# Patient Record
Sex: MALE | Race: WHITE | NOT HISPANIC OR LATINO | URBAN - METROPOLITAN AREA
[De-identification: names, ages, dates, MRNs, and addresses within clinical notes are randomized per-mention and may not be internally consistent; named-entity substitution may affect disease eponyms.]

---

## 2019-03-03 ENCOUNTER — EMERGENCY (EMERGENCY)
Facility: HOSPITAL | Age: 59
LOS: 1 days | End: 2019-03-03
Attending: EMERGENCY MEDICINE
Payer: COMMERCIAL

## 2019-03-03 VITALS
TEMPERATURE: 98 F | HEIGHT: 73 IN | RESPIRATION RATE: 18 BRPM | OXYGEN SATURATION: 96 % | DIASTOLIC BLOOD PRESSURE: 104 MMHG | SYSTOLIC BLOOD PRESSURE: 148 MMHG | HEART RATE: 68 BPM | WEIGHT: 250 LBS

## 2019-03-03 VITALS
HEART RATE: 70 BPM | OXYGEN SATURATION: 99 % | DIASTOLIC BLOOD PRESSURE: 90 MMHG | RESPIRATION RATE: 16 BRPM | SYSTOLIC BLOOD PRESSURE: 131 MMHG

## 2019-03-03 PROCEDURE — 99284 EMERGENCY DEPT VISIT MOD MDM: CPT | Mod: 25

## 2019-03-03 PROCEDURE — 73030 X-RAY EXAM OF SHOULDER: CPT

## 2019-03-03 PROCEDURE — 23500 CLTX CLAVICULAR FX W/O MNPJ: CPT | Mod: LT,54

## 2019-03-03 PROCEDURE — 23500 CLTX CLAVICULAR FX W/O MNPJ: CPT | Mod: LT

## 2019-03-03 PROCEDURE — 71046 X-RAY EXAM CHEST 2 VIEWS: CPT

## 2019-03-03 PROCEDURE — 71046 X-RAY EXAM CHEST 2 VIEWS: CPT | Mod: 26

## 2019-03-03 PROCEDURE — 73030 X-RAY EXAM OF SHOULDER: CPT | Mod: 26,LT

## 2019-03-03 RX ORDER — ACETAMINOPHEN 500 MG
975 TABLET ORAL ONCE
Qty: 0 | Refills: 0 | Status: COMPLETED | OUTPATIENT
Start: 2019-03-03 | End: 2019-03-03

## 2019-03-03 RX ORDER — LIDOCAINE 4 G/100G
1 CREAM TOPICAL ONCE
Qty: 0 | Refills: 0 | Status: COMPLETED | OUTPATIENT
Start: 2019-03-03 | End: 2019-03-03

## 2019-03-03 RX ORDER — IBUPROFEN 200 MG
600 TABLET ORAL ONCE
Qty: 0 | Refills: 0 | Status: COMPLETED | OUTPATIENT
Start: 2019-03-03 | End: 2019-03-03

## 2019-03-03 RX ADMIN — Medication 975 MILLIGRAM(S): at 22:57

## 2019-03-03 RX ADMIN — Medication 600 MILLIGRAM(S): at 22:57

## 2019-03-03 RX ADMIN — LIDOCAINE 1 PATCH: 4 CREAM TOPICAL at 22:57

## 2019-03-03 NOTE — ED PROVIDER NOTE - PRINCIPAL DIAGNOSIS
Closed nondisplaced fracture of left clavicle, unspecified part of clavicle, initial encounter Closed displaced fracture of left clavicle, unspecified part of clavicle, initial encounter

## 2019-03-03 NOTE — ED PROVIDER NOTE - PHYSICAL EXAMINATION
Gen: NAD, non-toxic, conversational  Eyes: PERRLA, EOMI   HENT: Normocephalic, atraumatic. External ears normal, no rhinorrhea, moist mucous membranes.   CV: RRR, no M/R/G  Resp: CTAB, non-labored, speaking without difficulty on room air  Abd: soft, non tender, non rigid, no guarding or rebound tenderness  Back: No CVAT bilaterally, no midline ttp  Ext: LUE with ttp over the AC, swelling, abrasion of skin, good ROM at the shoulder, 4/5 str on abduction on left, 5/5 on right, 5/5 adduction bilateral, 5/5 flex/ext bilateral  Skin: dry, wwp   Neuro: AOx3, speech is fluent and appropriate, CN 2-12 intact, sensation grossly intact, gait at baseline per patient (steppage gait)   Psych: Mood concerned, affect euthymic

## 2019-03-03 NOTE — ED PROVIDER NOTE - NSFOLLOWUPCLINICS_GEN_ALL_ED_FT
[facility]  Orthopedic Surgery  [address]  [place] [address]  Phone: [phone]  Fax:   Follow Up Time: 1-3 Days    [facility]  Orthopedic Surgery  [address], 3rd & 4th [place] [address]  Phone: [phone]  Fax:   Follow Up Time: 1-3 Days    [facility]  Orthopedic Surgery  [address]  [place] [address]  Phone: [phone]  Fax:   Follow Up Time: 1-3 Days    [facility]  Orthopedic Surgery  [address] [place] [address]  Phone: [phone]  Fax:   Follow Up Time: 1-3 Days
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
## 2019-03-03 NOTE — ED PROVIDER NOTE - NSFOLLOWUPINSTRUCTIONS_ED_ALL_ED_FT
Take acetaminophen (Tylenol) 975mg (3 regular strength tablets or 2 extra strength tablets) as often as every 6 hours as needed for pain. Never take more than 4000mg of acetaminophen/Tylenol in any 24 hour period. Be cautious of over the counter medications as many formularies contain acetaminophen in them. Take ibuprofen (or Motrin) 600mg (3 tablets) up to 4 times per day as needed for pain with food or milk. Follow up with the orthopedics referral of your choice, call in the morning to schedule an appointment to discuss your symptoms. Return here to the emergency department for new / worsening symptoms of concern such as severe intractable pain, numbness/weakness of your other extremities, incontinence of bowel or bladder, or other new symptoms of acute worry. Take acetaminophen (Tylenol) 975mg (3 regular strength tablets or 2 extra strength tablets) as often as every 6 hours as needed for pain. Never take more than 4000mg of acetaminophen/Tylenol in any 24 hour period. Be cautious of over the counter medications as many formularies contain acetaminophen in them. Take ibuprofen (or Motrin) 600mg (3 tablets) up to 4 times per day as needed for pain with food or milk. Follow up with the orthopedics referral of your choice, call in the morning to schedule an appointment to discuss your symptoms. Return here to the emergency department for new / worsening symptoms of concern such as severe intractable pain, numbness/weakness of your other extremities, incontinence of bowel or bladder, or other new symptoms of acute worry.    Keep sling on at all times until you follow up with orthopedist.

## 2019-03-03 NOTE — ED ADULT NURSE NOTE - CHPI ED NUR SYMPTOMS NEG
no headache/no loss of consciousness/no neck tenderness/no sleeping issues/no laceration/no acting out behaviors/no disorientation/no dizziness/no bruising/no fussiness/no crying/no decreased eating/drinking/no difficulty bearing weight/no back pain
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
## 2019-03-03 NOTE — ED PROVIDER NOTE - CARE PLAN
Principal Discharge DX:	Closed nondisplaced fracture of left clavicle, unspecified part of clavicle, initial encounter Principal Discharge DX:	Closed displaced fracture of left clavicle, unspecified part of clavicle, initial encounter
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
## 2019-03-03 NOTE — ED ADULT NURSE NOTE - NSIMPLEMENTINTERV_GEN_ALL_ED
Implemented All Universal Safety Interventions:  [place] to call system. Call bell, personal items and telephone within reach. Instruct patient to call for assistance. Room bathroom lighting operational. Non-slip footwear when patient is off stretcher. Physically safe environment: no spills, clutter or unnecessary equipment. Stretcher in lowest position, wheels locked, appropriate side rails in place.

## 2019-03-06 ENCOUNTER — APPOINTMENT (OUTPATIENT)
Dept: ORTHOPEDIC SURGERY | Facility: CLINIC | Age: 59
End: 2019-03-06
Payer: COMMERCIAL

## 2019-03-06 VITALS
DIASTOLIC BLOOD PRESSURE: 84 MMHG | SYSTOLIC BLOOD PRESSURE: 130 MMHG | BODY MASS INDEX: 33.13 KG/M2 | HEIGHT: 73 IN | HEART RATE: 76 BPM | WEIGHT: 250 LBS

## 2019-03-06 DIAGNOSIS — Z78.9 OTHER SPECIFIED HEALTH STATUS: ICD-10-CM

## 2019-03-06 DIAGNOSIS — Z60.2 PROBLEMS RELATED TO LIVING ALONE: ICD-10-CM

## 2019-03-06 DIAGNOSIS — M16.10 UNILATERAL PRIMARY OSTEOARTHRITIS, UNSPECIFIED HIP: ICD-10-CM

## 2019-03-06 PROCEDURE — 99204 OFFICE O/P NEW MOD 45 MIN: CPT

## 2019-03-06 RX ORDER — IBUPROFEN 800 MG
TABLET ORAL
Refills: 0 | Status: ACTIVE | COMMUNITY

## 2019-03-06 SDOH — SOCIAL STABILITY - SOCIAL INSECURITY: PROBLEMS RELATED TO LIVING ALONE: Z60.2

## 2019-03-06 NOTE — DISCUSSION/SUMMARY
[de-identified] : Discussed findings of today's exam and possible causes of patient's pain.  Educated patient on their diagnosis of displaced left clavicle fracture.  Reviewed possible courses of treatment, and we collaboratively decided best course of treatment at this time will include conservative management. I advised the patient to do his fracture morphology he should benefit well from nonsurgical management. He will be maintained in a shoulder sling 24/7 until reevaluated in 2 weeks. Patient states he is only taking Motrin 600 mg tabs as needed, use or providing good pain control. Advised if he needs any other medications he may notify my office. 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  Patient appreciates and agrees with current plan.\par \par This note was generated using dragon medical dictation software.  A reasonable effort has been made for proofreading its contents, but typos may still remain.  If there are any questions or points of clarification needed please notify my office.\par
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
## 2019-03-20 ENCOUNTER — APPOINTMENT (OUTPATIENT)
Dept: ORTHOPEDIC SURGERY | Facility: CLINIC | Age: 59
End: 2019-03-20
Payer: COMMERCIAL

## 2019-03-20 VITALS
HEART RATE: 59 BPM | HEIGHT: 73 IN | WEIGHT: 250 LBS | DIASTOLIC BLOOD PRESSURE: 82 MMHG | BODY MASS INDEX: 33.13 KG/M2 | SYSTOLIC BLOOD PRESSURE: 129 MMHG

## 2019-03-20 DIAGNOSIS — S42.022A DISPLACED FRACTURE OF SHAFT OF LEFT CLAVICLE, INITIAL ENCOUNTER FOR CLOSED FRACTURE: ICD-10-CM

## 2019-03-20 PROCEDURE — 73000 X-RAY EXAM OF COLLAR BONE: CPT | Mod: LT

## 2019-03-20 PROCEDURE — 99213 OFFICE O/P EST LOW 20 MIN: CPT

## 2019-03-25 NOTE — PHYSICAL EXAM
[de-identified] : Constitutional: Well-nourished, well-developed, No acute distress\par Respiratory:  Good respiratory effort, no SOB\par Lymphatic: No regional lymphadenopathy, no lymphedema\par Psychiatric: Pleasant and normal affect, alert and oriented x3\par Skin: Clean dry and intact B/L UE/LE\par Musculoskeletal: normal except where as noted in regional exam\par \par Left Shoulder:\par APPEARANCE: + deformity of midportion of clavicle, + swelling \par POSITIVE TENDERNESS: Significant at midportion of clavicle\par NONTENDER: supraspinatus, infraspinatus, teres minor, LH biceps, anterior and posterior capsule, AC joint \par ROM: Limited in all directions due to pain in clavicle \par RESISTIVE TESTING: deferred due to known fx\par SPECIAL TESTS: deferred due to known fx\par Vasc: 2+ radial pulse\par Neuro: AIN, PIN, Ulnar nerve intact to motor, DTRs 2+/4 biceps, triceps, brachioradialis\par Sensation: Intact to light touch throughout\par  [de-identified] : The following radiographs were ordered and read by me during this patient's visit. I reviewed each radiograph in detail with the patient and discussed the findings as highlighted below. \par \par 2 views of the left clavicle were obtained today that show a displaced midshaft fracture.  There is no callus formation at the fracture site  There is no malalignment. There is no joint dislocation, or degenerative changes seen. No other obvious osseous abnormality. Otherwise unremarkable.\par \par

## 2019-03-25 NOTE — DISCUSSION/SUMMARY
[de-identified] : The patient was seen today for continued management of left displaced clavicle fracture. He has no interval change in his x-ray findings his last visit. There is no significant callus formation as of yet at the fracture site. Patient is recommending continue wearing a shoulder sling with no lifting activity with the left arm/shoulder for the next 2 weeks. He is reminded to intermittently remove his sling and perform elbow, wrist and hand range of motion to maintain range of motion in these locations. Patient will follow up in 2 weeks for repeat x-ray and reassessment. No surgical intervention is recommended as of yet, patient has no malalignment, no shortening, no functional deficit based on fracture morphology at this time.  Patient appreciates and agrees with current plan.\par \par This note was generated using dragon medical dictation software.  A reasonable effort has been made for proofreading its contents, but typos may still remain.  If there are any questions or points of clarification needed please notify my office.

## 2019-04-03 ENCOUNTER — APPOINTMENT (OUTPATIENT)
Dept: ORTHOPEDIC SURGERY | Facility: CLINIC | Age: 59
End: 2019-04-03
Payer: COMMERCIAL

## 2019-04-03 VITALS
WEIGHT: 250 LBS | SYSTOLIC BLOOD PRESSURE: 129 MMHG | HEIGHT: 73 IN | BODY MASS INDEX: 33.13 KG/M2 | DIASTOLIC BLOOD PRESSURE: 86 MMHG | HEART RATE: 77 BPM

## 2019-04-03 DIAGNOSIS — S42.022D DISPLACED FRACTURE OF SHAFT OF LEFT CLAVICLE, SUBSEQUENT ENCOUNTER FOR FRACTURE WITH ROUTINE HEALING: ICD-10-CM

## 2019-04-03 PROCEDURE — 99213 OFFICE O/P EST LOW 20 MIN: CPT

## 2019-04-03 NOTE — DISCUSSION/SUMMARY
[de-identified] : Patient was seen today for continued management of displaced mid shaft oblique spiral clavicle fracture. At this time there is no new callus formation compared to prior x-ray.  Imaging shows delayed healing of the fracture, however clinically patient has no pain at the fracture site, and has full range of motion, and near full strength. Patient has no forced shortening of the fracture, and no immediate surgical indications as he has very good function and little to no pain. At this time recommend conservative management with a trial of returning to ADLs, and regular work duties.  Patient advised that he should followup in 2 months for repeat x-ray and reexamination. If he continues to have fibrous nonunion of the clavicle shaft, and he has any pain or limitations, may consider surgical consultation at that time.  Patient appreciates and agrees with current plan.\par \par This note was generated using dragon medical dictation software.  A reasonable effort has been made for proofreading its contents, but typos may still remain.  If there are any questions or points of clarification needed please notify my office.

## 2019-04-03 NOTE — HISTORY OF PRESENT ILLNESS
[de-identified] : Patient is here today following up after a displaced left clavicular fracture. As instructed, the patient has been compliant with immobilization in shoulder sling. Patient has had no complications or issues during immobilization, no trauma to the area, his pain is managed by NSAIDs. No new complaints.\par He is requesting a formal letter be written and faxed to his insurance company in regards to missing time from work. \par  \par
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
## 2019-04-03 NOTE — PHYSICAL EXAM
[de-identified] : Constitutional: Well-nourished, well-developed, No acute distress\par Respiratory:  Good respiratory effort, no SOB\par Lymphatic: No regional lymphadenopathy, no lymphedema\par Psychiatric: Pleasant and normal affect, alert and oriented x3\par Skin: Clean dry and intact B/L UE/LE\par Musculoskeletal: normal except where as noted in regional exam\par \par Left Shoulder:\par APPEARANCE: No visual deformity or malalignment of the clavicle, resolving ecchymosis overlying the left superior chest wall, no marked deformities, no swelling or malalignment\par POSITIVE TENDERNESS: none\par NONTENDER: supraspinatus, infraspinatus, teres minor, LH biceps, anterior and posterior capsule, AC joint \par ROM: full & painless, no scapular winging or dyskinesia present\par RESISTIVE TESTING: painless 5/5 resisted flex/ext, empty can/ER/IR, horizontal abd/add \par Vasc: 2+ radial pulse\par Neuro: AIN, PIN, Ulnar nerve intact to motor, DTRs 2+/4 biceps, triceps, brachioradialis\par Sensation: Intact to light touch throughout\par \par  [de-identified] : The following radiographs were ordered and read by me during this patient's visit. I reviewed each radiograph in detail with the patient and discussed the findings as highlighted below. \par \par 2 views of the left clavicle were obtained today that show a displaced midshaft fracture.  There is no new callus formation at the fracture site  There is no malalignment. There is no joint dislocation, or degenerative changes seen. No other obvious osseous abnormality. Otherwise unremarkable.\par \par

## 2019-06-12 ENCOUNTER — APPOINTMENT (OUTPATIENT)
Dept: ORTHOPEDIC SURGERY | Facility: CLINIC | Age: 59
End: 2019-06-12
Payer: COMMERCIAL

## 2019-06-19 ENCOUNTER — APPOINTMENT (OUTPATIENT)
Dept: ORTHOPEDIC SURGERY | Facility: CLINIC | Age: 59
End: 2019-06-19
Payer: COMMERCIAL

## 2019-06-19 VITALS
DIASTOLIC BLOOD PRESSURE: 79 MMHG | WEIGHT: 250 LBS | HEIGHT: 73 IN | BODY MASS INDEX: 33.13 KG/M2 | HEART RATE: 66 BPM | SYSTOLIC BLOOD PRESSURE: 138 MMHG

## 2019-06-19 DIAGNOSIS — S42.022G: ICD-10-CM

## 2019-06-19 PROCEDURE — 99213 OFFICE O/P EST LOW 20 MIN: CPT

## 2019-06-19 PROCEDURE — 73000 X-RAY EXAM OF COLLAR BONE: CPT | Mod: LT

## 2019-06-19 RX ORDER — IBUPROFEN 600 MG/1
600 TABLET, FILM COATED ORAL 3 TIMES DAILY
Qty: 90 | Refills: 0 | Status: ACTIVE | COMMUNITY
Start: 2019-03-06 | End: 1900-01-01

## 2019-06-19 NOTE — PHYSICAL EXAM
[de-identified] : The following radiographs were ordered and read by me during this patient's visit. I reviewed each radiograph in detail with the patient and discussed the findings as highlighted below. \par \par 2 views of the left clavicle were obtained today that show a displaced midshaft fracture.  There is new callus formation at the fracture site which is significantly improved from last xray.  There is no malalignment. There is no joint dislocation, or degenerative changes seen. No other obvious osseous abnormality. Otherwise unremarkable.\par \par  [de-identified] : Constitutional: Well-nourished, well-developed, No acute distress\par Respiratory:  Good respiratory effort, no SOB\par Lymphatic: No regional lymphadenopathy, no lymphedema\par Psychiatric: Pleasant and normal affect, alert and oriented x3\par Skin: Clean dry and intact B/L UE/LE\par Musculoskeletal: normal except where as noted in regional exam\par \par Left Shoulder:\par APPEARANCE: No visual deformity or malalignment of the clavicle, no ecchymosis overlying the left superior chest wall, no marked deformities, no swelling or malalignment\par POSITIVE TENDERNESS: none\par NONTENDER: supraspinatus, infraspinatus, teres minor, LH biceps, anterior and posterior capsule, AC joint \par ROM: full & painless, no scapular winging or dyskinesia present\par RESISTIVE TESTING: painless 5/5 resisted flex/ext, empty can/ER/IR, horizontal abd/add \par Vasc: 2+ radial pulse\par Neuro: AIN, PIN, Ulnar nerve intact to motor, DTRs 2+/4 biceps, triceps, brachioradialis\par Sensation: Intact to light touch throughout\par \par

## 2019-06-19 NOTE — DISCUSSION/SUMMARY
[de-identified] : Fracture. X-ray today shows significantly improved callus formation at the fracture site. He continues to have full range of motion and has been back at work without restrictions. At this time patient is reassured that he has good callous formation and the fracture will heal well with routine healing. Patient may continue activities as tolerated.  Follow up as needed.  Patient appreciates and agrees with current plan.\par \par This note was generated using dragon medical dictation software.  A reasonable effort has been made for proofreading its contents, but typos may still remain.  If there are any questions or points of clarification needed please notify my office.

## 2019-06-19 NOTE — HISTORY OF PRESENT ILLNESS
[de-identified] : Patient is here today following up after a displaced left clavicular fracture.  He is doing well. There has been no further injury or symptoms that have developed. \par

## 2019-10-02 PROBLEM — Z60.2 PERSON LIVING ALONE: Status: ACTIVE | Noted: 2019-03-06

## 2020-01-28 ENCOUNTER — APPOINTMENT (OUTPATIENT)
Dept: ORTHOPEDIC SURGERY | Facility: CLINIC | Age: 60
End: 2020-01-28
Payer: COMMERCIAL

## 2020-01-28 VITALS — BODY MASS INDEX: 33.8 KG/M2 | HEIGHT: 73 IN | WEIGHT: 255 LBS

## 2020-01-28 PROCEDURE — 99214 OFFICE O/P EST MOD 30 MIN: CPT

## 2020-01-28 PROCEDURE — 73502 X-RAY EXAM HIP UNI 2-3 VIEWS: CPT | Mod: LT

## 2020-01-28 NOTE — DISCUSSION/SUMMARY
[de-identified] : This patient is severe left hip osteoarthritis.  He has previously failed a course of conservative management and would like to proceed with a direct anterior approach left total of arthroplasty.\par \par The patient is an appropriate candidate for consideration of left total hip replacement. An extensive discussion was conducted of the natural history of the disease and the variety of surgical and non-surgical treatment options available to the patient. A risk/benefit analysis was discussed with the patient reviewing the advantages and disadvantages of surgical intervention at this time. Both the level and length of the patient's pain have made additional conservative treatment measures consisting of physical therapy, and/or corticosteroids contraindicated. A full explanation was given of the nature and the purpose of the procedure and anesthesia, its benefits, possible alternative methods of diagnosis or treatment, the risks involved, the possibility of complications, the foreseeable consequences of the procedure and the possible results of the non-treatment. I reviewed the plan of care as well as used a model of a total hip implant equivalent to the one that will be used for their total hip joint replacement. The ability to secure the implant utilizing cement or cementless (press-fit) was discussed with the patient. The patient agrees with the plan of care, as well as the use of implants for their total hip replacement. \par \par No guarantee or assurance was made as to the results that may be obtained. Specifically, the risks were identified to include, but are not limited to the following: Infection, phlebitis, pulmonary embolism, death, paralysis, dislocation, pain, stiffness, instability, limp, weakness, breakage, leg-length inequality, uncontrolled bleeding, nerve injury, blood vessel injury, pressure sores, anesthetic risks, delayed healing of wound and bone, and wear and loosening. Further discussion was undertaken with the patient about the details of surgical preparation, treatment, and postoperative rehabilitation including medical clearance, autotransfusion, the hospital course, and the postoperative rehabilitation involved. All in all, I feel that this patient is a good candidate for surgical reconstruction.\par

## 2020-01-28 NOTE — REVIEW OF SYSTEMS
[Joint Stiffness] : joint stiffness [Arthralgia] : arthralgia [Joint Pain] : joint pain [Negative] : Endocrine

## 2020-01-28 NOTE — PHYSICAL EXAM
[de-identified] : Patient is well nourished, well-developed, in no acute distress, with appropriate mood and affect. The patient is oriented to time, place, and person. Respirations are even and unlabored. Gait evaluation reveals a limp. There is no inguinal adenopathy. Examination of the contralateral hip shows normal range of motion, strength, no tenderness, and intact skin. The affected limb is well-perfused, shows a grossly normal motor and sensory examination. Examination of the hip shows no skin lesions. Hip motion is reduced and causes pain. Leg lengths are approximately equal. Stinchfield test is positive. Both hips are stable and muscle strength is normal. Pedal pulses are palpable.\par  [de-identified] : AP and lateral x-rays of the left hip, pelvis, and femur were ordered and taken in the office and demonstrate severe degenerative joint disease of the hip with joint space narrowing, osteophyte formation, and subchondral sclerosis.\par

## 2020-03-16 ENCOUNTER — OUTPATIENT (OUTPATIENT)
Dept: OUTPATIENT SERVICES | Facility: HOSPITAL | Age: 60
LOS: 1 days | End: 2020-03-16
Payer: COMMERCIAL

## 2020-03-16 VITALS
OXYGEN SATURATION: 96 % | TEMPERATURE: 99 F | HEIGHT: 73 IN | RESPIRATION RATE: 18 BRPM | HEART RATE: 76 BPM | DIASTOLIC BLOOD PRESSURE: 81 MMHG | SYSTOLIC BLOOD PRESSURE: 155 MMHG | WEIGHT: 259.93 LBS

## 2020-03-16 DIAGNOSIS — Z98.890 OTHER SPECIFIED POSTPROCEDURAL STATES: Chronic | ICD-10-CM

## 2020-03-16 DIAGNOSIS — M16.11 UNILATERAL PRIMARY OSTEOARTHRITIS, RIGHT HIP: ICD-10-CM

## 2020-03-16 DIAGNOSIS — Z01.818 ENCOUNTER FOR OTHER PREPROCEDURAL EXAMINATION: ICD-10-CM

## 2020-03-16 DIAGNOSIS — M19.90 UNSPECIFIED OSTEOARTHRITIS, UNSPECIFIED SITE: ICD-10-CM

## 2020-03-16 LAB
ANION GAP SERPL CALC-SCNC: 14 MMOL/L — SIGNIFICANT CHANGE UP (ref 5–17)
BLD GP AB SCN SERPL QL: NEGATIVE — SIGNIFICANT CHANGE UP
BUN SERPL-MCNC: 22 MG/DL — SIGNIFICANT CHANGE UP (ref 7–23)
CALCIUM SERPL-MCNC: 9.3 MG/DL — SIGNIFICANT CHANGE UP (ref 8.4–10.5)
CHLORIDE SERPL-SCNC: 102 MMOL/L — SIGNIFICANT CHANGE UP (ref 96–108)
CO2 SERPL-SCNC: 22 MMOL/L — SIGNIFICANT CHANGE UP (ref 22–31)
CREAT SERPL-MCNC: 1.01 MG/DL — SIGNIFICANT CHANGE UP (ref 0.5–1.3)
GLUCOSE SERPL-MCNC: 100 MG/DL — HIGH (ref 70–99)
HBA1C BLD-MCNC: 5.5 % — SIGNIFICANT CHANGE UP (ref 4–5.6)
HCT VFR BLD CALC: 44.9 % — SIGNIFICANT CHANGE UP (ref 39–50)
HGB BLD-MCNC: 14.8 G/DL — SIGNIFICANT CHANGE UP (ref 13–17)
MCHC RBC-ENTMCNC: 29.1 PG — SIGNIFICANT CHANGE UP (ref 27–34)
MCHC RBC-ENTMCNC: 33 GM/DL — SIGNIFICANT CHANGE UP (ref 32–36)
MCV RBC AUTO: 88.2 FL — SIGNIFICANT CHANGE UP (ref 80–100)
NRBC # BLD: 0 /100 WBCS — SIGNIFICANT CHANGE UP (ref 0–0)
PLATELET # BLD AUTO: 118 K/UL — LOW (ref 150–400)
POTASSIUM SERPL-MCNC: 4.2 MMOL/L — SIGNIFICANT CHANGE UP (ref 3.5–5.3)
POTASSIUM SERPL-SCNC: 4.2 MMOL/L — SIGNIFICANT CHANGE UP (ref 3.5–5.3)
RBC # BLD: 5.09 M/UL — SIGNIFICANT CHANGE UP (ref 4.2–5.8)
RBC # FLD: 12.5 % — SIGNIFICANT CHANGE UP (ref 10.3–14.5)
RH IG SCN BLD-IMP: NEGATIVE — SIGNIFICANT CHANGE UP
SODIUM SERPL-SCNC: 138 MMOL/L — SIGNIFICANT CHANGE UP (ref 135–145)
WBC # BLD: 3.26 K/UL — LOW (ref 3.8–10.5)
WBC # FLD AUTO: 3.26 K/UL — LOW (ref 3.8–10.5)

## 2020-03-16 PROCEDURE — 86900 BLOOD TYPING SEROLOGIC ABO: CPT

## 2020-03-16 PROCEDURE — 87640 STAPH A DNA AMP PROBE: CPT

## 2020-03-16 PROCEDURE — G0463: CPT

## 2020-03-16 PROCEDURE — 86901 BLOOD TYPING SEROLOGIC RH(D): CPT

## 2020-03-16 PROCEDURE — 87641 MR-STAPH DNA AMP PROBE: CPT

## 2020-03-16 PROCEDURE — 80048 BASIC METABOLIC PNL TOTAL CA: CPT

## 2020-03-16 PROCEDURE — 83036 HEMOGLOBIN GLYCOSYLATED A1C: CPT

## 2020-03-16 PROCEDURE — 86850 RBC ANTIBODY SCREEN: CPT

## 2020-03-16 PROCEDURE — 85027 COMPLETE CBC AUTOMATED: CPT

## 2020-03-16 NOTE — H&P PST ADULT - HISTORY OF PRESENT ILLNESS
60 y/o M PMH osteoarthritis, c/o left hip pain for several years, getting progressively worse over the past year.  Presents today for left direct anterior approach total hip arthroplasty.

## 2020-03-16 NOTE — H&P PST ADULT - NSICDXPROBLEM_GEN_ALL_CORE_FT
PROBLEM DIAGNOSES  Problem: Osteoarthritis  Assessment and Plan: Left direct anterior approach total hip arthroplasty

## 2020-03-16 NOTE — H&P PST ADULT - ASSESSMENT
TAMARAI VTE 2.0 SCORE [CLOT updated 2019]    AGE RELATED RISK FACTORS                                                       MOBILITY RELATED FACTORS  [ x] Age 41-60 years                                            (1 Point)                    [ ] Bed rest                                                        (1 Point)  [ ] Age: 61-74 years                                           (2 Points)                  [ ] Plaster cast                                                   (2 Points)  [ ] Age= 75 years                                              (3 Points)                    [ ] Bed bound for more than 72 hours                 (2 Points)    DISEASE RELATED RISK FACTORS                                               GENDER SPECIFIC FACTORS  [ ] Edema in the lower extremities                       (1 Point)              [ ] Pregnancy                                                     (1 Point)  [ ] Varicose veins                                               (1 Point)                     [ ] Post-partum < 6 weeks                                   (1 Point)             [x ] BMI > 25 Kg/m2                                            (1 Point)                     [ ] Hormonal therapy  or oral contraception          (1 Point)                 [ ] Sepsis (in the previous month)                        (1 Point)               [ ] History of pregnancy complications                 (1 point)  [ ] Pneumonia or serious lung disease                                               [ ] Unexplained or recurrent                     (1 Point)           (in the previous month)                               (1 Point)  [ ] Abnormal pulmonary function test                     (1 Point)                 SURGERY RELATED RISK FACTORS  [ ] Acute myocardial infarction                              (1 Point)               [ ]  Section                                             (1 Point)  [ ] Congestive heart failure (in the previous month)  (1 Point)      [ ] Minor surgery                                                  (1 Point)   [ ] Inflammatory bowel disease                             (1 Point)               [ ] Arthroscopic surgery                                        (2 Points)  [ ] Central venous access                                      (2 Points)                [ ] General surgery lasting more than 45 minutes (2 points)  [ ] Malignancy- Present or previous                   (2 Points)                [x ] Elective arthroplasty                                         (5 points)    [ ] Stroke (in the previous month)                          (5 Points)                                                                                                                                                           HEMATOLOGY RELATED FACTORS                                                 TRAUMA RELATED RISK FACTORS  [ ] Prior episodes of VTE                                     (3 Points)                [ ] Fracture of the hip, pelvis, or leg                       (5 Points)  [ ] Positive family history for VTE                         (3 Points)             [ ] Acute spinal cord injury (in the previous month)  (5 Points)  [ ] Prothrombin 90128 A                                     (3 Points)               [ ] Paralysis  (less than 1 month)                             (5 Points)  [ ] Factor V Leiden                                             (3 Points)                  [ ] Multiple Trauma within 1 month                        (5 Points)  [ ] Lupus anticoagulants                                     (3 Points)                                                           [ ] Anticardiolipin antibodies                               (3 Points)                                                       [ ] High homocysteine in the blood                      (3 Points)                                             [ ] Other congenital or acquired thrombophilia      (3 Points)                                                [ ] Heparin induced thrombocytopenia                  (3 Points)                                     Total Score [     7     ]

## 2020-03-17 LAB
MRSA PCR RESULT.: SIGNIFICANT CHANGE UP
S AUREUS DNA NOSE QL NAA+PROBE: SIGNIFICANT CHANGE UP

## 2020-04-02 ENCOUNTER — APPOINTMENT (OUTPATIENT)
Dept: ORTHOPEDIC SURGERY | Facility: HOSPITAL | Age: 60
End: 2020-04-02

## 2020-08-04 NOTE — HISTORY OF PRESENT ILLNESS
[de-identified] : Patient is here today following up after a displaced left clavicular fracture. As instructed, the patient has been compliant with immobilization in shoulder sling.  Patient has had no complications or issues during immobilization, no trauma to the area, his pain is managed by NSAIDs. No new complaints.\par   Is This A New Presentation, Or A Follow-Up?: Skin Lesions How Severe Is Your Skin Lesion?: moderate Have Your Skin Lesions Been Treated?: not been treated

## 2020-08-16 NOTE — H&P PST ADULT - COMFORT LEVEL, ACCEPTABLE
Plan of care reviewed with patient. Voices understanding. NSR on monitor with no red alarms noted. Side rails up x3, bed low, bed alarm on, call bell within reach. Will continue to monitor.    2
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
## 2020-11-18 PROBLEM — M10.9 GOUT, UNSPECIFIED: Chronic | Status: ACTIVE | Noted: 2020-03-16

## 2020-11-18 PROBLEM — N20.0 CALCULUS OF KIDNEY: Chronic | Status: ACTIVE | Noted: 2020-03-16

## 2020-11-25 ENCOUNTER — APPOINTMENT (OUTPATIENT)
Dept: ORTHOPEDIC SURGERY | Facility: CLINIC | Age: 60
End: 2020-11-25
Payer: COMMERCIAL

## 2020-11-25 VITALS — TEMPERATURE: 98 F

## 2020-11-25 PROCEDURE — 73502 X-RAY EXAM HIP UNI 2-3 VIEWS: CPT | Mod: LT

## 2020-11-25 PROCEDURE — 99214 OFFICE O/P EST MOD 30 MIN: CPT

## 2020-11-25 NOTE — HISTORY OF PRESENT ILLNESS
[de-identified] : This is very nice 60-year-old gentleman experiencing left hip and groin and thigh pain, which is severe in intensity.  I have previously diagnosed with severe left hip osteoarthritis.  The pain substantially limits activities of daily living. Walking tolerance is reduced. Medication including prescription NSAIDs and activity modification have been minimally effective for a period lasting greater than three months in duration. Assistive devices and external support were not deemed by the patient to be helpful in improving their function. Due to the severity of osteoarthritis and level of pain, physical therapy is contraindicated.  He is already completed a course of physical therapy.  5 years ago he tried a left hip intra-articular cortisone injection which helped for short period of time. Pain and restriction of function are intolerable at this time.  We previously planning a direct anterior proximal left total of arthroplasty but the pandemic delayed these plans.  He now comes in ready to proceed.

## 2020-11-25 NOTE — PHYSICAL EXAM
[de-identified] : Patient is well nourished, well-developed, in no acute distress, with appropriate mood and affect. The patient is oriented to time, place, and person. Respirations are even and unlabored. Gait evaluation reveals a limp. There is no inguinal adenopathy. Examination of the contralateral hip shows normal range of motion, strength, no tenderness, and intact skin. The affected limb is well-perfused, shows a grossly normal motor and sensory examination. Examination of the hip shows no skin lesions. Hip motion is reduced and causes pain. Leg lengths are approximately 1 cm short on the left.  Stinchfield test is positive. Both hips are stable and muscle strength is normal. Pedal pulses are palpable.\par  [de-identified] : AP and lateral x-rays of the left hip, pelvis, and femur were ordered and taken in the office and demonstrate severe degenerative joint disease of the hip with joint space narrowing, osteophyte formation, and subchondral sclerosis.\par

## 2020-11-25 NOTE — DISCUSSION/SUMMARY
[de-identified] : This patient is severe left hip osteoarthritis.  He has previously failed a course of conservative management and would like to proceed with a direct anterior approach left total of arthroplasty.\par \par The patient is an appropriate candidate for consideration of left total hip replacement. An extensive discussion was conducted of the natural history of the disease and the variety of surgical and non-surgical treatment options available to the patient. A risk/benefit analysis was discussed with the patient reviewing the advantages and disadvantages of surgical intervention at this time. Both the level and length of the patient's pain have made additional conservative treatment measures consisting of NSAIDs, physical therapy, and/or corticosteroids contraindicated. A full explanation was given of the nature and the purpose of the procedure and anesthesia, its benefits, possible alternative methods of diagnosis or treatment, the risks involved, the possibility of complications, the foreseeable consequences of the procedure and the possible results of the non-treatment. I reviewed the plan of care as well as used a model of a total hip implant equivalent to the one that will be used for their total hip joint replacement. The ability to secure the implant utilizing cement or cementless (press-fit) was discussed with the patient. The patient agrees with the plan of care, as well as the use of implants for their total hip replacement. \par \par No guarantee or assurance was made as to the results that may be obtained. Specifically, the risks were identified to include, but are not limited to the following: Infection, phlebitis, pulmonary embolism, death, paralysis, dislocation, pain, stiffness, instability, limp, weakness, breakage, leg-length inequality, uncontrolled bleeding, nerve injury, blood vessel injury, pressure sores, anesthetic risks, delayed healing of wound and bone, and wear and loosening. Further discussion was undertaken with the patient about the details of surgical preparation, treatment, and postoperative rehabilitation including medical clearance, autotransfusion, the hospital course, and the postoperative rehabilitation involved. All in all, I feel that this patient is a good candidate for surgical reconstruction.\par \par The patient and I discussed the current SARS-CoV-2 (COVID-19) pandemic which has affected our local hospitals. We discussed that our hospitals treat patients with COVID-19. All efforts will be made to avoid cohorting the patient with diagnosed or suspected COVID-19 patient. They also understand that we will screen them 24-48 hours prior to surgery. Despite our best efforts, there is a potential risk for iatrogenic transmission of COVID-19 to the patient during the perioperative period. Chris COVID-19 during the perioperative period may increase the patient´s risks of an adverse outcome including postoperative pneumonia, difficulty breathing, requirement for a breathing tube (general endotracheal intubation), and death. The patient is understanding of this risk, and is willing to proceed with surgery at this time.\par \par This patient understands that the implants that will be used may wear out or loosen over time resulting in failure of the device. Given the patient's relatively young age, this may require one or multiple revision operations in their lifetime. The patient understands this and is willing to proceed with surgery at this time.  (12/18) K+ 3.4, BUN 6,

## 2021-01-11 ENCOUNTER — OUTPATIENT (OUTPATIENT)
Dept: OUTPATIENT SERVICES | Facility: HOSPITAL | Age: 61
LOS: 1 days | End: 2021-01-11
Payer: COMMERCIAL

## 2021-01-11 VITALS
WEIGHT: 244.93 LBS | OXYGEN SATURATION: 96 % | HEART RATE: 79 BPM | RESPIRATION RATE: 16 BRPM | DIASTOLIC BLOOD PRESSURE: 82 MMHG | TEMPERATURE: 98 F | HEIGHT: 73 IN | SYSTOLIC BLOOD PRESSURE: 163 MMHG

## 2021-01-11 DIAGNOSIS — Z29.9 ENCOUNTER FOR PROPHYLACTIC MEASURES, UNSPECIFIED: ICD-10-CM

## 2021-01-11 DIAGNOSIS — Z87.39 PERSONAL HISTORY OF OTHER DISEASES OF THE MUSCULOSKELETAL SYSTEM AND CONNECTIVE TISSUE: ICD-10-CM

## 2021-01-11 DIAGNOSIS — M16.12 UNILATERAL PRIMARY OSTEOARTHRITIS, LEFT HIP: ICD-10-CM

## 2021-01-11 DIAGNOSIS — Z98.890 OTHER SPECIFIED POSTPROCEDURAL STATES: Chronic | ICD-10-CM

## 2021-01-11 DIAGNOSIS — Z01.818 ENCOUNTER FOR OTHER PREPROCEDURAL EXAMINATION: ICD-10-CM

## 2021-01-11 LAB
A1C WITH ESTIMATED AVERAGE GLUCOSE RESULT: 5 % — SIGNIFICANT CHANGE UP (ref 4–5.6)
ANION GAP SERPL CALC-SCNC: 13 MMOL/L — SIGNIFICANT CHANGE UP (ref 5–17)
BLD GP AB SCN SERPL QL: NEGATIVE — SIGNIFICANT CHANGE UP
BUN SERPL-MCNC: 31 MG/DL — HIGH (ref 7–23)
CALCIUM SERPL-MCNC: 10.3 MG/DL — SIGNIFICANT CHANGE UP (ref 8.4–10.5)
CHLORIDE SERPL-SCNC: 104 MMOL/L — SIGNIFICANT CHANGE UP (ref 96–108)
CO2 SERPL-SCNC: 23 MMOL/L — SIGNIFICANT CHANGE UP (ref 22–31)
CREAT SERPL-MCNC: 0.95 MG/DL — SIGNIFICANT CHANGE UP (ref 0.5–1.3)
ESTIMATED AVERAGE GLUCOSE: 97 MG/DL — SIGNIFICANT CHANGE UP (ref 68–114)
GLUCOSE SERPL-MCNC: 108 MG/DL — HIGH (ref 70–99)
HCT VFR BLD CALC: 49.7 % — SIGNIFICANT CHANGE UP (ref 39–50)
HGB BLD-MCNC: 16.6 G/DL — SIGNIFICANT CHANGE UP (ref 13–17)
MCHC RBC-ENTMCNC: 30.2 PG — SIGNIFICANT CHANGE UP (ref 27–34)
MCHC RBC-ENTMCNC: 33.4 GM/DL — SIGNIFICANT CHANGE UP (ref 32–36)
MCV RBC AUTO: 90.4 FL — SIGNIFICANT CHANGE UP (ref 80–100)
MRSA PCR RESULT.: SIGNIFICANT CHANGE UP
NRBC # BLD: 0 /100 WBCS — SIGNIFICANT CHANGE UP (ref 0–0)
PLATELET # BLD AUTO: 197 K/UL — SIGNIFICANT CHANGE UP (ref 150–400)
POTASSIUM SERPL-MCNC: 4.7 MMOL/L — SIGNIFICANT CHANGE UP (ref 3.5–5.3)
POTASSIUM SERPL-SCNC: 4.7 MMOL/L — SIGNIFICANT CHANGE UP (ref 3.5–5.3)
RBC # BLD: 5.5 M/UL — SIGNIFICANT CHANGE UP (ref 4.2–5.8)
RBC # FLD: 12.2 % — SIGNIFICANT CHANGE UP (ref 10.3–14.5)
RH IG SCN BLD-IMP: NEGATIVE — SIGNIFICANT CHANGE UP
S AUREUS DNA NOSE QL NAA+PROBE: SIGNIFICANT CHANGE UP
SODIUM SERPL-SCNC: 140 MMOL/L — SIGNIFICANT CHANGE UP (ref 135–145)
WBC # BLD: 6.41 K/UL — SIGNIFICANT CHANGE UP (ref 3.8–10.5)
WBC # FLD AUTO: 6.41 K/UL — SIGNIFICANT CHANGE UP (ref 3.8–10.5)

## 2021-01-11 PROCEDURE — 85027 COMPLETE CBC AUTOMATED: CPT

## 2021-01-11 PROCEDURE — 80048 BASIC METABOLIC PNL TOTAL CA: CPT

## 2021-01-11 PROCEDURE — 83036 HEMOGLOBIN GLYCOSYLATED A1C: CPT

## 2021-01-11 PROCEDURE — 86900 BLOOD TYPING SEROLOGIC ABO: CPT

## 2021-01-11 PROCEDURE — 86901 BLOOD TYPING SEROLOGIC RH(D): CPT

## 2021-01-11 PROCEDURE — 87641 MR-STAPH DNA AMP PROBE: CPT

## 2021-01-11 PROCEDURE — 86850 RBC ANTIBODY SCREEN: CPT

## 2021-01-11 PROCEDURE — G0463: CPT

## 2021-01-11 PROCEDURE — 87640 STAPH A DNA AMP PROBE: CPT

## 2021-01-11 RX ORDER — LIDOCAINE HCL 20 MG/ML
0.2 VIAL (ML) INJECTION ONCE
Refills: 0 | Status: DISCONTINUED | OUTPATIENT
Start: 2021-01-25 | End: 2021-01-26

## 2021-01-11 RX ORDER — CEFAZOLIN SODIUM 1 G
2000 VIAL (EA) INJECTION ONCE
Refills: 0 | Status: DISCONTINUED | OUTPATIENT
Start: 2021-01-25 | End: 2021-01-26

## 2021-01-11 NOTE — H&P PST ADULT - HISTORY OF PRESENT ILLNESS
58 y/o M PMH osteoarthritis, c/o left hip pain for several years, getting progressively worse over the past year.  Presents today for left direct anterior approach total hip arthroplasty. 61 y/o M PMH osteoarthritis presents today for presurgical evaluation.  He c/o left hip pain for several years, getting progressively worse over the past year.  He was scheduled to have this surgery in March 2020, but surgery was cancelled due to Covid-19 pandemic.  He is scheduled for direct anterior approach left total hip arthroplasty on 1/25/21.     Preop Covid swab is scheduled for 1/22/21 at [facility].

## 2021-01-11 NOTE — H&P PST ADULT - HEALTH CARE MAINTENANCE
Sees PMD routinely for medical management, did not receive flu vaccine Annual physical exam  did not receive flu vaccine

## 2021-01-11 NOTE — H&P PST ADULT - NSICDXPROBLEM_GEN_ALL_CORE_FT
PROBLEM DIAGNOSES  Problem: H/O: osteoarthritis  Assessment and Plan: Pt. is scheduled for direct anterior approach left total hip arthroplasty on 1/25/21.  Preop instructions reviewed, pt. verbalized understanding.  Preop labs and MRSA swab done today.  Preop Covid swab scheduled on 1/22/21.  Pt. instructed to obtain medical clearance by surgeon.    Problem: Need for prophylactic measure  Assessment and Plan: The Caprini score indicates that this patient is at high risk for a VTE event (score 6 or greater). Surgical patients in this group will benefit from both pharmacologic prophylaxis and intermittent compression devices.  The surgical team will determine the balance between VTE risk and bleeding risk, and other clinical considerations        PROBLEM DIAGNOSES  Problem: Need for prophylactic measure  Assessment and Plan: OR booking notified for ODALIS precautions.     Problem: H/O: osteoarthritis  Assessment and Plan: Pt. is scheduled for direct anterior approach left total hip arthroplasty on 1/25/21.  Preop instructions reviewed, pt. verbalized understanding.  Preop labs and MRSA swab done today.  Preop Covid swab scheduled on 1/22/21.  Pt. instructed to obtain medical clearance by surgeon.    Problem: Need for prophylactic measure  Assessment and Plan: The Caprini score indicates that this patient is at high risk for a VTE event (score 6 or greater). Surgical patients in this group will benefit from both pharmacologic prophylaxis and intermittent compression devices.  The surgical team will determine the balance between VTE risk and bleeding risk, and other clinical considerations

## 2021-01-11 NOTE — H&P PST ADULT - ASSESSMENT
TAMARAI VTE 2.0 SCORE [CLOT updated 2019]    AGE RELATED RISK FACTORS                                                       MOBILITY RELATED FACTORS  [x ] Age 41-60 years                                            (1 Point)                    [ ] Bed rest                                                        (1 Point)  [ ] Age: 61-74 years                                           (2 Points)                  [ ] Plaster cast                                                   (2 Points)  [ ] Age= 75 years                                              (3 Points)                    [ ] Bed bound for more than 72 hours                 (2 Points)    DISEASE RELATED RISK FACTORS                                               GENDER SPECIFIC FACTORS  [ ] Edema in the lower extremities                       (1 Point)              [ ] Pregnancy                                                     (1 Point)  [ ] Varicose veins                                               (1 Point)                     [ ] Post-partum < 6 weeks                                   (1 Point)             [x ] BMI > 25 Kg/m2                                            (1 Point)                     [ ] Hormonal therapy  or oral contraception          (1 Point)                 [ ] Sepsis (in the previous month)                        (1 Point)               [ ] History of pregnancy complications                 (1 point)  [ ] Pneumonia or serious lung disease                                               [ ] Unexplained or recurrent                     (1 Point)           (in the previous month)                               (1 Point)  [ ] Abnormal pulmonary function test                     (1 Point)                 SURGERY RELATED RISK FACTORS  [ ] Acute myocardial infarction                              (1 Point)               [ ]  Section                                             (1 Point)  [ ] Congestive heart failure (in the previous month)  (1 Point)      [ ] Minor surgery                                                  (1 Point)   [ ] Inflammatory bowel disease                             (1 Point)               [ ] Arthroscopic surgery                                        (2 Points)  [ ] Central venous access                                      (2 Points)                [ ] General surgery lasting more than 45 minutes (2 points)  [ ] Malignancy- Present or previous                   (2 Points)                [x ] Elective arthroplasty                                         (5 points)    [ ] Stroke (in the previous month)                          (5 Points)                                                                                                                                                           HEMATOLOGY RELATED FACTORS                                                 TRAUMA RELATED RISK FACTORS  [ ] Prior episodes of VTE                                     (3 Points)                [ ] Fracture of the hip, pelvis, or leg                       (5 Points)  [ ] Positive family history for VTE                         (3 Points)             [ ] Acute spinal cord injury (in the previous month)  (5 Points)  [ ] Prothrombin 91559 A                                     (3 Points)               [ ] Paralysis  (less than 1 month)                             (5 Points)  [ ] Factor V Leiden                                             (3 Points)                  [ ] Multiple Trauma within 1 month                        (5 Points)  [ ] Lupus anticoagulants                                     (3 Points)                                                           [ ] Anticardiolipin antibodies                               (3 Points)                                                       [ ] High homocysteine in the blood                      (3 Points)                                             [ ] Other congenital or acquired thrombophilia      (3 Points)                                                [ ] Heparin induced thrombocytopenia                  (3 Points)                                     Total Score [          ] TAMARAI VTE 2.0 SCORE [CLOT updated 2019]    AGE RELATED RISK FACTORS                                                       MOBILITY RELATED FACTORS  [x ] Age 41-60 years                                            (1 Point)                    [ ] Bed rest                                                        (1 Point)  [ ] Age: 61-74 years                                           (2 Points)                  [ ] Plaster cast                                                   (2 Points)  [ ] Age= 75 years                                              (3 Points)                    [ ] Bed bound for more than 72 hours                 (2 Points)    DISEASE RELATED RISK FACTORS                                               GENDER SPECIFIC FACTORS  [ ] Edema in the lower extremities                       (1 Point)              [ ] Pregnancy                                                     (1 Point)  [ ] Varicose veins                                               (1 Point)                     [ ] Post-partum < 6 weeks                                   (1 Point)             [x ] BMI > 25 Kg/m2                                            (1 Point)                     [ ] Hormonal therapy  or oral contraception          (1 Point)                 [ ] Sepsis (in the previous month)                        (1 Point)               [ ] History of pregnancy complications                 (1 point)  [ ] Pneumonia or serious lung disease                                               [ ] Unexplained or recurrent                     (1 Point)           (in the previous month)                               (1 Point)  [ ] Abnormal pulmonary function test                     (1 Point)                 SURGERY RELATED RISK FACTORS  [ ] Acute myocardial infarction                              (1 Point)               [ ]  Section                                             (1 Point)  [ ] Congestive heart failure (in the previous month)  (1 Point)      [ ] Minor surgery                                                  (1 Point)   [ ] Inflammatory bowel disease                             (1 Point)               [ ] Arthroscopic surgery                                        (2 Points)  [ ] Central venous access                                      (2 Points)                [ ] General surgery lasting more than 45 minutes (2 points)  [ ] Malignancy- Present or previous                   (2 Points)                [x ] Elective arthroplasty                                         (5 points)    [ ] Stroke (in the previous month)                          (5 Points)                                                                                                                                                           HEMATOLOGY RELATED FACTORS                                                 TRAUMA RELATED RISK FACTORS  [ ] Prior episodes of VTE                                     (3 Points)                [ ] Fracture of the hip, pelvis, or leg                       (5 Points)  [ ] Positive family history for VTE                         (3 Points)             [ ] Acute spinal cord injury (in the previous month)  (5 Points)  [ ] Prothrombin 12427 A                                     (3 Points)               [ ] Paralysis  (less than 1 month)                             (5 Points)  [ ] Factor V Leiden                                             (3 Points)                  [ ] Multiple Trauma within 1 month                        (5 Points)  [ ] Lupus anticoagulants                                     (3 Points)                                                           [ ] Anticardiolipin antibodies                               (3 Points)                                                       [ ] High homocysteine in the blood                      (3 Points)                                             [ ] Other congenital or acquired thrombophilia      (3 Points)                                                [ ] Heparin induced thrombocytopenia                  (3 Points)                                     Total Score [     7     ]

## 2021-01-11 NOTE — H&P PST ADULT - ACTIVITY
able to perform yardwork, moderate recreational activities able to perform yard work, moderate recreational activities

## 2021-01-11 NOTE — H&P PST ADULT - NSICDXPASTMEDICALHX_GEN_ALL_CORE_FT
PAST MEDICAL HISTORY:  Gout     Nephrolithiasis     No pertinent past medical history PAST MEDICAL HISTORY:  Gout     Nephrolithiasis     OA (osteoarthritis)

## 2021-01-11 NOTE — H&P PST ADULT - NSICDXPASTSURGICALHX_GEN_ALL_CORE_FT
PAST SURGICAL HISTORY:  H/O lithotripsy ureteral stent placement PAST SURGICAL HISTORY:  H/O lithotripsy ureteral stent placement (multiple times 2013, 2001)

## 2021-01-19 PROBLEM — M19.90 UNSPECIFIED OSTEOARTHRITIS, UNSPECIFIED SITE: Chronic | Status: ACTIVE | Noted: 2021-01-11

## 2021-01-22 ENCOUNTER — OUTPATIENT (OUTPATIENT)
Dept: OUTPATIENT SERVICES | Facility: HOSPITAL | Age: 61
LOS: 1 days | End: 2021-01-22
Payer: COMMERCIAL

## 2021-01-22 DIAGNOSIS — Z98.890 OTHER SPECIFIED POSTPROCEDURAL STATES: Chronic | ICD-10-CM

## 2021-01-22 DIAGNOSIS — Z11.52 ENCOUNTER FOR SCREENING FOR COVID-19: ICD-10-CM

## 2021-01-22 LAB — SARS-COV-2 RNA SPEC QL NAA+PROBE: SIGNIFICANT CHANGE UP

## 2021-01-22 PROCEDURE — U0003: CPT

## 2021-01-22 PROCEDURE — U0005: CPT

## 2021-01-22 PROCEDURE — C9803: CPT

## 2021-01-24 ENCOUNTER — TRANSCRIPTION ENCOUNTER (OUTPATIENT)
Age: 61
End: 2021-01-24

## 2021-01-25 ENCOUNTER — INPATIENT (INPATIENT)
Facility: HOSPITAL | Age: 61
LOS: 0 days | Discharge: HOME HEALTH | DRG: 470 | End: 2021-01-26
Attending: ORTHOPAEDIC SURGERY | Admitting: ORTHOPAEDIC SURGERY
Payer: COMMERCIAL

## 2021-01-25 ENCOUNTER — APPOINTMENT (OUTPATIENT)
Dept: ORTHOPEDIC SURGERY | Facility: HOSPITAL | Age: 61
End: 2021-01-25

## 2021-01-25 VITALS
RESPIRATION RATE: 18 BRPM | SYSTOLIC BLOOD PRESSURE: 160 MMHG | HEART RATE: 67 BPM | WEIGHT: 244.93 LBS | DIASTOLIC BLOOD PRESSURE: 88 MMHG | TEMPERATURE: 98 F | HEIGHT: 73 IN | OXYGEN SATURATION: 95 %

## 2021-01-25 DIAGNOSIS — Z98.890 OTHER SPECIFIED POSTPROCEDURAL STATES: Chronic | ICD-10-CM

## 2021-01-25 DIAGNOSIS — M16.12 UNILATERAL PRIMARY OSTEOARTHRITIS, LEFT HIP: ICD-10-CM

## 2021-01-25 PROCEDURE — 27130 TOTAL HIP ARTHROPLASTY: CPT | Mod: LT

## 2021-01-25 PROCEDURE — 72170 X-RAY EXAM OF PELVIS: CPT | Mod: 26

## 2021-01-25 RX ORDER — OXYCODONE HYDROCHLORIDE 5 MG/1
5 TABLET ORAL EVERY 4 HOURS
Refills: 0 | Status: DISCONTINUED | OUTPATIENT
Start: 2021-01-25 | End: 2021-01-26

## 2021-01-25 RX ORDER — HYDROMORPHONE HYDROCHLORIDE 2 MG/ML
0.5 INJECTION INTRAMUSCULAR; INTRAVENOUS; SUBCUTANEOUS
Refills: 0 | Status: DISCONTINUED | OUTPATIENT
Start: 2021-01-25 | End: 2021-01-25

## 2021-01-25 RX ORDER — ACETAMINOPHEN 500 MG
1000 TABLET ORAL ONCE
Refills: 0 | Status: COMPLETED | OUTPATIENT
Start: 2021-01-26 | End: 2021-01-26

## 2021-01-25 RX ORDER — PANTOPRAZOLE SODIUM 20 MG/1
40 TABLET, DELAYED RELEASE ORAL
Refills: 0 | Status: DISCONTINUED | OUTPATIENT
Start: 2021-01-25 | End: 2021-01-26

## 2021-01-25 RX ORDER — PANTOPRAZOLE SODIUM 20 MG/1
40 TABLET, DELAYED RELEASE ORAL ONCE
Refills: 0 | Status: COMPLETED | OUTPATIENT
Start: 2021-01-25 | End: 2021-01-25

## 2021-01-25 RX ORDER — CEFAZOLIN SODIUM 1 G
2000 VIAL (EA) INJECTION EVERY 8 HOURS
Refills: 0 | Status: COMPLETED | OUTPATIENT
Start: 2021-01-25 | End: 2021-01-26

## 2021-01-25 RX ORDER — ACETAMINOPHEN 500 MG
1000 TABLET ORAL ONCE
Refills: 0 | Status: COMPLETED | OUTPATIENT
Start: 2021-01-25 | End: 2021-01-25

## 2021-01-25 RX ORDER — KETOROLAC TROMETHAMINE 30 MG/ML
15 SYRINGE (ML) INJECTION EVERY 6 HOURS
Refills: 0 | Status: COMPLETED | OUTPATIENT
Start: 2021-01-25 | End: 2021-01-26

## 2021-01-25 RX ORDER — SODIUM CHLORIDE 9 MG/ML
500 INJECTION, SOLUTION INTRAVENOUS
Refills: 0 | Status: COMPLETED | OUTPATIENT
Start: 2021-01-25 | End: 2021-01-25

## 2021-01-25 RX ORDER — TRAMADOL HYDROCHLORIDE 50 MG/1
50 TABLET ORAL ONCE
Refills: 0 | Status: DISCONTINUED | OUTPATIENT
Start: 2021-01-25 | End: 2021-01-25

## 2021-01-25 RX ORDER — OXYCODONE HYDROCHLORIDE 5 MG/1
10 TABLET ORAL EVERY 4 HOURS
Refills: 0 | Status: DISCONTINUED | OUTPATIENT
Start: 2021-01-25 | End: 2021-01-26

## 2021-01-25 RX ORDER — CHLORHEXIDINE GLUCONATE 213 G/1000ML
1 SOLUTION TOPICAL ONCE
Refills: 0 | Status: COMPLETED | OUTPATIENT
Start: 2021-01-25 | End: 2021-01-25

## 2021-01-25 RX ORDER — SODIUM CHLORIDE 9 MG/ML
1000 INJECTION, SOLUTION INTRAVENOUS
Refills: 0 | Status: DISCONTINUED | OUTPATIENT
Start: 2021-01-25 | End: 2021-01-26

## 2021-01-25 RX ORDER — TRAMADOL HYDROCHLORIDE 50 MG/1
50 TABLET ORAL EVERY 6 HOURS
Refills: 0 | Status: DISCONTINUED | OUTPATIENT
Start: 2021-01-25 | End: 2021-01-26

## 2021-01-25 RX ORDER — CELECOXIB 200 MG/1
200 CAPSULE ORAL EVERY 12 HOURS
Refills: 0 | Status: DISCONTINUED | OUTPATIENT
Start: 2021-01-26 | End: 2021-01-26

## 2021-01-25 RX ORDER — DEXAMETHASONE 0.5 MG/5ML
8 ELIXIR ORAL ONCE
Refills: 0 | Status: COMPLETED | OUTPATIENT
Start: 2021-01-26 | End: 2021-01-26

## 2021-01-25 RX ORDER — ASCORBIC ACID 60 MG
1 TABLET,CHEWABLE ORAL
Qty: 0 | Refills: 0 | DISCHARGE

## 2021-01-25 RX ORDER — ONDANSETRON 8 MG/1
4 TABLET, FILM COATED ORAL EVERY 6 HOURS
Refills: 0 | Status: DISCONTINUED | OUTPATIENT
Start: 2021-01-25 | End: 2021-01-26

## 2021-01-25 RX ORDER — SENNA PLUS 8.6 MG/1
2 TABLET ORAL AT BEDTIME
Refills: 0 | Status: DISCONTINUED | OUTPATIENT
Start: 2021-01-25 | End: 2021-01-26

## 2021-01-25 RX ORDER — MAGNESIUM HYDROXIDE 400 MG/1
30 TABLET, CHEWABLE ORAL DAILY
Refills: 0 | Status: DISCONTINUED | OUTPATIENT
Start: 2021-01-25 | End: 2021-01-26

## 2021-01-25 RX ORDER — ASCORBIC ACID 60 MG
500 TABLET,CHEWABLE ORAL
Refills: 0 | Status: DISCONTINUED | OUTPATIENT
Start: 2021-01-25 | End: 2021-01-26

## 2021-01-25 RX ORDER — HYDROMORPHONE HYDROCHLORIDE 2 MG/ML
0.5 INJECTION INTRAMUSCULAR; INTRAVENOUS; SUBCUTANEOUS ONCE
Refills: 0 | Status: COMPLETED | OUTPATIENT
Start: 2021-01-25

## 2021-01-25 RX ORDER — ACETAMINOPHEN 500 MG
975 TABLET ORAL EVERY 8 HOURS
Refills: 0 | Status: DISCONTINUED | OUTPATIENT
Start: 2021-01-26 | End: 2021-01-26

## 2021-01-25 RX ORDER — ALLOPURINOL 300 MG
300 TABLET ORAL DAILY
Refills: 0 | Status: DISCONTINUED | OUTPATIENT
Start: 2021-01-25 | End: 2021-01-26

## 2021-01-25 RX ORDER — ALLOPURINOL 300 MG
1 TABLET ORAL
Qty: 0 | Refills: 0 | DISCHARGE

## 2021-01-25 RX ORDER — INFLUENZA VIRUS VACCINE 15; 15; 15; 15 UG/.5ML; UG/.5ML; UG/.5ML; UG/.5ML
0.5 SUSPENSION INTRAMUSCULAR ONCE
Refills: 0 | Status: COMPLETED | OUTPATIENT
Start: 2021-01-25 | End: 2021-01-25

## 2021-01-25 RX ORDER — ASPIRIN/CALCIUM CARB/MAGNESIUM 324 MG
81 TABLET ORAL
Refills: 0 | Status: DISCONTINUED | OUTPATIENT
Start: 2021-01-25 | End: 2021-01-26

## 2021-01-25 RX ORDER — SODIUM CHLORIDE 9 MG/ML
500 INJECTION, SOLUTION INTRAVENOUS
Refills: 0 | Status: COMPLETED | OUTPATIENT
Start: 2021-01-26 | End: 2021-01-26

## 2021-01-25 RX ORDER — IBUPROFEN 200 MG
1 TABLET ORAL
Qty: 0 | Refills: 0 | DISCHARGE

## 2021-01-25 RX ORDER — ACETAMINOPHEN 500 MG
1000 TABLET ORAL ONCE
Refills: 0 | Status: DISCONTINUED | OUTPATIENT
Start: 2021-01-25 | End: 2021-01-25

## 2021-01-25 RX ORDER — FOLIC ACID 0.8 MG
1 TABLET ORAL DAILY
Refills: 0 | Status: DISCONTINUED | OUTPATIENT
Start: 2021-01-25 | End: 2021-01-26

## 2021-01-25 RX ORDER — SODIUM CHLORIDE 9 MG/ML
3 INJECTION INTRAMUSCULAR; INTRAVENOUS; SUBCUTANEOUS EVERY 8 HOURS
Refills: 0 | Status: DISCONTINUED | OUTPATIENT
Start: 2021-01-25 | End: 2021-01-25

## 2021-01-25 RX ADMIN — PANTOPRAZOLE SODIUM 40 MILLIGRAM(S): 20 TABLET, DELAYED RELEASE ORAL at 08:31

## 2021-01-25 RX ADMIN — Medication 81 MILLIGRAM(S): at 13:35

## 2021-01-25 RX ADMIN — SENNA PLUS 2 TABLET(S): 8.6 TABLET ORAL at 22:13

## 2021-01-25 RX ADMIN — Medication 1 TABLET(S): at 22:12

## 2021-01-25 RX ADMIN — Medication 400 MILLIGRAM(S): at 20:35

## 2021-01-25 RX ADMIN — Medication 1 TABLET(S): at 14:55

## 2021-01-25 RX ADMIN — SODIUM CHLORIDE 500 MILLILITER(S): 9 INJECTION, SOLUTION INTRAVENOUS at 17:37

## 2021-01-25 RX ADMIN — Medication 500 MILLIGRAM(S): at 17:35

## 2021-01-25 RX ADMIN — Medication 100 MILLIGRAM(S): at 18:29

## 2021-01-25 RX ADMIN — Medication 150 MILLIGRAM(S): at 08:32

## 2021-01-25 RX ADMIN — Medication 1 MILLIGRAM(S): at 14:55

## 2021-01-25 RX ADMIN — TRAMADOL HYDROCHLORIDE 50 MILLIGRAM(S): 50 TABLET ORAL at 08:31

## 2021-01-25 RX ADMIN — SODIUM CHLORIDE 500 MILLILITER(S): 9 INJECTION, SOLUTION INTRAVENOUS at 13:09

## 2021-01-25 RX ADMIN — Medication 15 MILLIGRAM(S): at 17:35

## 2021-01-25 NOTE — PHYSICAL THERAPY INITIAL EVALUATION ADULT - TRANSFER SAFETY CONCERNS NOTED: SIT/STAND, REHAB EVAL
decreased balance during turns/losing balance/decreased sequencing ability/decreased step length/decreased weight-shifting ability

## 2021-01-25 NOTE — CHART NOTE - NSCHARTNOTEFT_GEN_A_CORE
POC    Resting without complaints.  No Chest Pain, SOB, N/V.    T(C): 36.4 (01-25-21 @ 12:30), Max: 36.7 (01-25-21 @ 08:25)  HR: 75 (01-25-21 @ 13:30) (67 - 75)  BP: 121/62 (01-25-21 @ 13:15) (112/69 - 160/88)  RR: 18 (01-25-21 @ 13:30) (18 - 18)  SpO2: 94% (01-25-21 @ 13:30) (94% - 100%)  Wt(kg): --    Exam:  Alert and [place], No Acute Distress  Pulm: CTAB  Abdomen soft / benign  Huitron  [ n]   EXT   LLE        Aquacel dressing C/D [x ]        Calves soft       (+) DF  PF  EHL/FHL  5/5        No Sensory Deficits noted        2+ pulses    Xray:---- prosthesis in good alignment       A/P: S/p  L Total hip replacement    -PT/OT-WBAT-  -Chk AM Labs  -DVT PPx: Ecotrin 81 BID  -Pain Control PO/IV Pain Rx  -Continue Current Tx  -Dispo planning: anticipate home      ***See Above  Jules ANSARI  Orthopedics  B: [phone]  S: [phone]

## 2021-01-25 NOTE — PACU DISCHARGE NOTE - AIRWAY PATENCY:
Edema   WHAT YOU NEED TO KNOW:   What is edema? Edema is swelling throughout your body  Edema is usually a sign that you are retaining fluid  The swelling may be caused by heart failure or kidney, thyroid, or liver disease  It may also be caused by medicines such as antidepressants, blood pressure medicines, or hormones  Sudden swelling around the lips or face may be a sign of a severe allergic reaction  Swelling of an arm or leg may be caused by blockage of your veins  What other signs and symptoms may occur with edema? · Discomfort or tenderness in the swollen areas    · Tight and shiny skin over the swollen areas    · Weight gain  How is edema diagnosed? Your healthcare provider will ask about your symptoms and any other symptoms you have  He may also ask about any medical conditions you have  Your healthcare provider will examine your skin over the swollen areas  He may gently push on the swollen area for a short time to see if this leaves a dimple  He may also order tests to find the cause of your edema  How is edema treated and managed? Treatment for edema depends on the cause  Depending on your medical condition, you may be given medicine to help get rid of extra body fluid  Your healthcare provider may suggest that you do any of the following to help manage edema:  · Elevate  your arms or legs as directed  Raise them above the level of your heart as often as you can  This will help decrease swelling and pain  Prop them on pillows or blankets to keep them elevated comfortably  · Wear pressure stockings as directed  The stockings are tight and put pressure on your legs  This helps to keep fluid from collecting in your legs or ankles  · Limit your salt intake  Salt causes your body to hold water  Ask about any other changes to your diet  · Stay active  Do not stand or sit for long periods of time  Ask your healthcare provider about the best exercise plan for you      · Keep your skin moist Satisfactory using lotion, cream, or ointment  Ask your healthcare provider what to use and how often to use it  When should I contact my healthcare provider? · The swollen area feels cold and is pale or blue in color  · The swollen area feels warm, painful, and is red in color  · You have increased swelling or swelling in other parts of your body  · You have questions or concerns about your condition or care  When should I seek immediate care? · You have shortness of breath at rest, especially when you lie down  · You cough up pink, foamy sputum  · You have chest pain  · Your heartbeat is fast or uneven  CARE AGREEMENT:   You have the right to help plan your care  Learn about your health condition and how it may be treated  Discuss treatment options with your caregivers to decide what care you want to receive  You always have the right to refuse treatment  The above information is an  only  It is not intended as medical advice for individual conditions or treatments  Talk to your doctor, nurse or pharmacist before following any medical regimen to see if it is safe and effective for you  © 2017 [employer]  Information is for End User's use only and may not be sold, redistributed or otherwise used for commercial purposes  All illustrations and images included in CareNotes® are the copyrighted property of [employer]  or Hayden Betts

## 2021-01-25 NOTE — PHYSICAL THERAPY INITIAL EVALUATION ADULT - ADDITIONAL COMMENTS
Pt lives in a  alone while his children are at college, reports having 20 steps to negotiate. Therefore he will be staying at his elderly parents house upon D/C with no steps to negotiate. Was ambulating (I) without an AD and was (I) with all ADLS PTA Pt lives in a PH alone, reports having 15 steps to negotiate. Therefore he will be staying at his elderly parents house upon D/C with no steps to negotiate. Was ambulating (I) without an AD and was (I) with all ADLS PTA

## 2021-01-25 NOTE — PHYSICAL THERAPY INITIAL EVALUATION ADULT - GAIT DEVIATIONS NOTED, PT EVAL
decreased deborah/increased time in double stance/decreased step length/decreased stride length/decreased weight-shifting ability

## 2021-01-25 NOTE — PHYSICAL THERAPY INITIAL EVALUATION ADULT - PERTINENT HX OF CURRENT PROBLEM, REHAB EVAL
59 y/o M PMH osteoarthritis presents today for presurgical evaluation.  He c/o left hip pain for several years, getting progressively worse over the past year.  He was scheduled to have this surgery in March 2020, but surgery was cancelled due to Covid-19 pandemic. Pt now s/p direct anterior approach left total hip arthroplasty on 1/25/21.

## 2021-01-26 ENCOUNTER — TRANSCRIPTION ENCOUNTER (OUTPATIENT)
Age: 61
End: 2021-01-26

## 2021-01-26 VITALS — OXYGEN SATURATION: 95 % | SYSTOLIC BLOOD PRESSURE: 146 MMHG | HEART RATE: 74 BPM | DIASTOLIC BLOOD PRESSURE: 74 MMHG

## 2021-01-26 LAB
ANION GAP SERPL CALC-SCNC: 11 MMOL/L — SIGNIFICANT CHANGE UP (ref 5–17)
BUN SERPL-MCNC: 24 MG/DL — HIGH (ref 7–23)
CALCIUM SERPL-MCNC: 9.3 MG/DL — SIGNIFICANT CHANGE UP (ref 8.4–10.5)
CHLORIDE SERPL-SCNC: 101 MMOL/L — SIGNIFICANT CHANGE UP (ref 96–108)
CO2 SERPL-SCNC: 26 MMOL/L — SIGNIFICANT CHANGE UP (ref 22–31)
CREAT SERPL-MCNC: 1.02 MG/DL — SIGNIFICANT CHANGE UP (ref 0.5–1.3)
GLUCOSE SERPL-MCNC: 135 MG/DL — HIGH (ref 70–99)
HCT VFR BLD CALC: 38.2 % — LOW (ref 39–50)
HGB BLD-MCNC: 12.8 G/DL — LOW (ref 13–17)
MCHC RBC-ENTMCNC: 30.8 PG — SIGNIFICANT CHANGE UP (ref 27–34)
MCHC RBC-ENTMCNC: 33.5 GM/DL — SIGNIFICANT CHANGE UP (ref 32–36)
MCV RBC AUTO: 92 FL — SIGNIFICANT CHANGE UP (ref 80–100)
NRBC # BLD: 0 /100 WBCS — SIGNIFICANT CHANGE UP (ref 0–0)
PLATELET # BLD AUTO: 159 K/UL — SIGNIFICANT CHANGE UP (ref 150–400)
POTASSIUM SERPL-MCNC: 4.5 MMOL/L — SIGNIFICANT CHANGE UP (ref 3.5–5.3)
POTASSIUM SERPL-SCNC: 4.5 MMOL/L — SIGNIFICANT CHANGE UP (ref 3.5–5.3)
RBC # BLD: 4.15 M/UL — LOW (ref 4.2–5.8)
RBC # FLD: 12.5 % — SIGNIFICANT CHANGE UP (ref 10.3–14.5)
SODIUM SERPL-SCNC: 138 MMOL/L — SIGNIFICANT CHANGE UP (ref 135–145)
WBC # BLD: 8.3 K/UL — SIGNIFICANT CHANGE UP (ref 3.8–10.5)
WBC # FLD AUTO: 8.3 K/UL — SIGNIFICANT CHANGE UP (ref 3.8–10.5)

## 2021-01-26 PROCEDURE — 80048 BASIC METABOLIC PNL TOTAL CA: CPT

## 2021-01-26 PROCEDURE — 97162 PT EVAL MOD COMPLEX 30 MIN: CPT

## 2021-01-26 PROCEDURE — 97530 THERAPEUTIC ACTIVITIES: CPT

## 2021-01-26 PROCEDURE — 72170 X-RAY EXAM OF PELVIS: CPT

## 2021-01-26 PROCEDURE — 82962 GLUCOSE BLOOD TEST: CPT

## 2021-01-26 PROCEDURE — 97165 OT EVAL LOW COMPLEX 30 MIN: CPT

## 2021-01-26 PROCEDURE — C1776: CPT

## 2021-01-26 PROCEDURE — 97161 PT EVAL LOW COMPLEX 20 MIN: CPT

## 2021-01-26 PROCEDURE — 85027 COMPLETE CBC AUTOMATED: CPT

## 2021-01-26 RX ORDER — TRAMADOL HYDROCHLORIDE 50 MG/1
1 TABLET ORAL
Qty: 28 | Refills: 0
Start: 2021-01-26 | End: 2021-02-01

## 2021-01-26 RX ORDER — OXYCODONE HYDROCHLORIDE 5 MG/1
1 TABLET ORAL
Qty: 50 | Refills: 0
Start: 2021-01-26

## 2021-01-26 RX ORDER — PANTOPRAZOLE SODIUM 20 MG/1
1 TABLET, DELAYED RELEASE ORAL
Qty: 0 | Refills: 0 | DISCHARGE
Start: 2021-01-26

## 2021-01-26 RX ORDER — ASPIRIN/CALCIUM CARB/MAGNESIUM 324 MG
1 TABLET ORAL
Qty: 84 | Refills: 0
Start: 2021-01-26 | End: 2021-03-08

## 2021-01-26 RX ORDER — OXYCODONE HYDROCHLORIDE 5 MG/1
1 TABLET ORAL
Qty: 0 | Refills: 0 | DISCHARGE
Start: 2021-01-26

## 2021-01-26 RX ORDER — ACETAMINOPHEN 500 MG
3 TABLET ORAL
Qty: 0 | Refills: 0 | DISCHARGE
Start: 2021-01-26

## 2021-01-26 RX ORDER — HYDROMORPHONE HYDROCHLORIDE 2 MG/ML
0.5 INJECTION INTRAMUSCULAR; INTRAVENOUS; SUBCUTANEOUS ONCE
Refills: 0 | Status: DISCONTINUED | OUTPATIENT
Start: 2021-01-26 | End: 2021-01-26

## 2021-01-26 RX ORDER — TRAMADOL HYDROCHLORIDE 50 MG/1
1 TABLET ORAL
Qty: 0 | Refills: 0 | DISCHARGE
Start: 2021-01-26

## 2021-01-26 RX ORDER — PANTOPRAZOLE SODIUM 20 MG/1
1 TABLET, DELAYED RELEASE ORAL
Qty: 30 | Refills: 0
Start: 2021-01-26 | End: 2021-02-24

## 2021-01-26 RX ORDER — ASPIRIN/CALCIUM CARB/MAGNESIUM 324 MG
1 TABLET ORAL
Qty: 0 | Refills: 0 | DISCHARGE
Start: 2021-01-26

## 2021-01-26 RX ORDER — SENNA PLUS 8.6 MG/1
2 TABLET ORAL
Qty: 0 | Refills: 0 | DISCHARGE
Start: 2021-01-26

## 2021-01-26 RX ADMIN — Medication 81 MILLIGRAM(S): at 04:47

## 2021-01-26 RX ADMIN — Medication 1 TABLET(S): at 04:47

## 2021-01-26 RX ADMIN — Medication 15 MILLIGRAM(S): at 00:27

## 2021-01-26 RX ADMIN — CELECOXIB 200 MILLIGRAM(S): 200 CAPSULE ORAL at 04:47

## 2021-01-26 RX ADMIN — SODIUM CHLORIDE 500 MILLILITER(S): 9 INJECTION, SOLUTION INTRAVENOUS at 06:01

## 2021-01-26 RX ADMIN — Medication 15 MILLIGRAM(S): at 04:47

## 2021-01-26 RX ADMIN — Medication 300 MILLIGRAM(S): at 10:06

## 2021-01-26 RX ADMIN — PANTOPRAZOLE SODIUM 40 MILLIGRAM(S): 20 TABLET, DELAYED RELEASE ORAL at 04:47

## 2021-01-26 RX ADMIN — Medication 1 MILLIGRAM(S): at 10:29

## 2021-01-26 RX ADMIN — Medication 100 MILLIGRAM(S): at 01:39

## 2021-01-26 RX ADMIN — Medication 400 MILLIGRAM(S): at 01:39

## 2021-01-26 RX ADMIN — Medication 500 MILLIGRAM(S): at 04:47

## 2021-01-26 RX ADMIN — Medication 975 MILLIGRAM(S): at 10:06

## 2021-01-26 RX ADMIN — Medication 8 MILLIGRAM(S): at 05:04

## 2021-01-26 RX ADMIN — Medication 1 TABLET(S): at 10:29

## 2021-01-26 NOTE — PROGRESS NOTE ADULT - ASSESSMENT
S/P LT THR      Plan    ck labs  OOB/PT/WBAT  Aspirin for DVT prophylaxis  analgesics as needed       Sharmila Boyd PA-C   Beeper    [id number]

## 2021-01-26 NOTE — OCCUPATIONAL THERAPY INITIAL EVALUATION ADULT - DIAGNOSIS, OT EVAL
Patient presents with decreased balance, strength, impacting ability to perform ADLs and functional mobility

## 2021-01-26 NOTE — OCCUPATIONAL THERAPY INITIAL EVALUATION ADULT - LIVES WITH, PROFILE
Lives alone, however plans to go home with father, no steps to enter +bed/bath on 1st floor with walk in shower

## 2021-01-26 NOTE — DISCHARGE NOTE PROVIDER - CARE PROVIDER_API CALL
Devon Daily)  Orthopedics  [address], [address]  [place] [address]  Phone: [phone]  Fax: [phone]  Follow Up Time:

## 2021-01-26 NOTE — PROGRESS NOTE ADULT - SUBJECTIVE AND OBJECTIVE BOX
Orthopedics Post-op Check    POD 0 Total Hip Arthroplasty  Pt seen and examined. Pain is controlled. Feeling well overall.  .)    Vital Signs Last 24 Hrs  T(C): 36.7 (01-25-21 @ 08:25), Max: 36.7 (01-25-21 @ 08:25)  T(F): 98.1 (01-25-21 @ 08:25), Max: 98.1 (01-25-21 @ 08:25)  HR: 67 (01-25-21 @ 08:25) (67 - 67)  BP: 160/88 (01-25-21 @ 08:25) (160/88 - 160/88)  BP(mean): --  RR: 18 (01-25-21 @ 08:25) (18 - 18)  SpO2: 95% (01-25-21 @ 08:25) (95% - 95%)              Exam:  NAD AAOx3  Dressing clean and dry aquacell  +EHL FHL TA GS  SILT toes 1-5  +DP  Calf Soft NT    A/P:  60y M s/p L anterior Total Hip Arthroplasty POD #0  -Pain control  - DVT ppx  - WBAT/PT/OOB  - Anterior Hip precautions  - FU labs in AM  - Med mgmt  - D/C Planning
Patient is a 60y old  Male who presents with a chief complaint of total hip arthroplasty (25 Jan 2021 12:35)      POST OPERATIVE DAY #:  1  Patient  OOB in chair, no complaints    VS:  T(C): 36.5 (01-26-21 @ 04:08), Max: 36.9 (01-25-21 @ 21:00)  T(F): 97.7 (01-26-21 @ 04:08), Max: 98.5 (01-25-21 @ 21:00)  HR: 66 (01-26-21 @ 04:08) (65 - 84)  BP: 116/69 (01-26-21 @ 04:08) (103/60 - 160/88)  RR: 18 (01-26-21 @ 04:08) (15 - 18)  SpO2: 97% (01-26-21 @ 04:08) (92% - 100%)  Wt(kg): --      PHYSICAL EXAM:  NAD, Alert  EXT:   Lt Hip  Aquacel Dressing C/D/I  No Calf Tenderness  (+) DF/PF; (+) Distal Pulses;  Sensation: No gross deficits noted  Pulses 2+   B/L, PAS

## 2021-01-26 NOTE — DISCHARGE NOTE PROVIDER - NSDCFUADDAPPT_GEN_ALL_CORE_FT
Please call Dr. Daily' s office within next few days to schedule a follow up appointment about 14 days after surgery.    Recommend follow up with medical MD, within next 4 weeks.

## 2021-01-26 NOTE — DISCHARGE NOTE NURSING/CASE MANAGEMENT/SOCIAL WORK - NSDCFUADDAPPT_GEN_ALL_CORE_FT
No complaints Please call Dr. Daily' s office within next few days to schedule a follow up appointment about 14 days after surgery.    Recommend follow up with medical MD, within next 4 weeks.

## 2021-01-26 NOTE — DISCHARGE NOTE PROVIDER - HOSPITAL COURSE
History of Present Illness    59 y/o M PMH osteoarthritis presents today for presurgical evaluation.  He c/o left hip pain for several years, getting progressively worse over the past year.  He was scheduled to have this surgery in March 2020, but surgery was cancelled due to Covid-19 pandemic.  He is scheduled for direct anterior approach left total hip arthroplasty on 1/25/21.     Preop Covid swab is scheduled for 1/22/21 at [facility].     Allergies/Medications:   Allergies:        Allergies:  	No Known Allergies:     Home Medications:   * Patient Currently Takes Medications as of 11-Jan-2021 09:59 documented in Structured Notes  · 	allopurinol 300 mg oral tablet: Last Dose Taken:  , 1 tab(s) orally once a day  · 	ibuprofen 600 mg oral tablet: Last Dose Taken:  , 1 tab(s) orally every 8 hours, As Needed  · 	Vitamin C 250 mg oral tablet: Last Dose Taken:  , 1 tab(s) orally once a day  · 	Multiple Vitamins oral tablet: Last Dose Taken:  , 1 tab(s) orally once a day    PMH/PSH/FH/SH:    Past Medical, Past Surgical, and Family History:  PAST MEDICAL HISTORY:  Gout   Nephrolithiasis   OA (osteoarthritis).     PAST SURGICAL HISTORY:  H/O lithotripsy ureteral stent placement (multiple times 2013, 2001)    1/25/21- Patient presents to the hospital for elective surgery, underwent a left total hip replacement  - tolerated procedure without complication.  Patient was evaluated by Physical and Occupational therapy whom recommended home for discharge plan.  Patient is stable for discharge when cleared by PT.

## 2021-01-26 NOTE — DISCHARGE NOTE PROVIDER - NSDCMRMEDTOKEN_GEN_ALL_CORE_FT
acetaminophen 325 mg oral tablet: 3 tab(s) orally every 8 hours x 7 days then as needed  allopurinol 300 mg oral tablet: 1 tab(s) orally once a day  aspirin 81 mg oral tablet, chewable: 1 tab(s) orally 2 times a day  ibuprofen 600 mg oral tablet: 1 tab(s) orally every 8 hours, As Needed  Multiple Vitamins oral tablet: 1 tab(s) orally once a day  oxyCODONE 5 mg oral tablet: 1 or 2 tab(s) orally every 4 hours, As needed, for Pain   pantoprazole 40 mg oral delayed release tablet: 1 tab(s) orally once a day (before a meal)  Rolling Walker: Dx: Left Total Hip Replacement    CARLOZ 99 months  senna oral tablet: 2 tab(s) orally once a day (at bedtime)  traMADol 50 mg oral tablet: 1 tab(s) orally every 6 hours, As needed, Mild Pain (1 - 3)  Vitamin C 250 mg oral tablet: 1 tab(s) orally once a day   acetaminophen 325 mg oral tablet: 3 tab(s) orally every 8 hours x 7 days then as needed  allopurinol 300 mg oral tablet: 1 tab(s) orally once a day  aspirin 81 mg oral tablet, chewable: 1 tab(s) orally 2 times a day MDD:2  ibuprofen 600 mg oral tablet: 1 tab(s) orally every 8 hours, As Needed  Multiple Vitamins oral tablet: 1 tab(s) orally once a day  oxyCODONE 5 mg oral tablet: 1 or 2 tab(s) orally every 4 hours, As needed, for Pain MDD:8  pantoprazole 40 mg oral delayed release tablet: 1 tab(s) orally once a day (before a meal) MDD:1  senna oral tablet: 2 tab(s) orally once a day (at bedtime)  traMADol 50 mg oral tablet: 1 tab(s) orally every 6 hours, As needed, Mild Pain (1 - 3) MDD:4  Vitamin C 250 mg oral tablet: 1 tab(s) orally once a day

## 2021-01-26 NOTE — DISCHARGE NOTE PROVIDER - NSDCCPTREATMENT_GEN_ALL_CORE_FT
PRINCIPAL PROCEDURE  Procedure: Total hip replacements  Findings and Treatment: left anterior PABLO

## 2021-01-26 NOTE — DISCHARGE NOTE PROVIDER - NSDCFUADDINST_GEN_ALL_CORE_FT
Dressing will be changed during office visit. Out of bed, ambulate, weight bearing as tolerated- Physical therapy to assist with exercise and help increase endurance.    Patient may shower, limit direct water to dressing, if wet pat dry

## 2021-01-26 NOTE — DISCHARGE NOTE NURSING/CASE MANAGEMENT/SOCIAL WORK - PATIENT PORTAL LINK FT
You can access the FollowMyHealth Patient Portal offered by [facility] by registering at the following website: [email or web address]. By joining [employer]’s FollowMyHealth portal, you will also be able to view your health information using other applications (apps) compatible with our system.

## 2021-01-26 NOTE — OCCUPATIONAL THERAPY INITIAL EVALUATION ADULT - ANTICIPATED DISCHARGE DISPOSITION, OT EVAL
Home OT to address deficits, assess home safety, increase independence in ADLs and functional mobility.

## 2021-01-26 NOTE — PROGRESS NOTE ADULT - ATTENDING COMMENTS
I agree with the above note and have personally seen and examined this patient. All pertinent films have been reviewed. Please refer to clinical documentation of the history, physical examinations, data summary, and both assessment and plan as documented above and with which I agree.    Devon Daily MD  Attending Orthopedic Surgeon
I agree with the above note and have personally seen and examined this patient. All pertinent films have been reviewed. Please refer to clinical documentation of the history, physical examinations, data summary, and both assessment and plan as documented above and with which I agree.    did very well with pt yesterday  will dc home today with home pt    Devon Daily MD  Attending Orthopedic Surgeon

## 2021-02-08 DIAGNOSIS — Z00.00 ENCOUNTER FOR GENERAL ADULT MEDICAL EXAMINATION W/OUT ABNORMAL FINDINGS: ICD-10-CM

## 2021-02-09 ENCOUNTER — APPOINTMENT (OUTPATIENT)
Dept: ORTHOPEDIC SURGERY | Facility: CLINIC | Age: 61
End: 2021-02-09
Payer: COMMERCIAL

## 2021-02-09 VITALS — TEMPERATURE: 97.1 F

## 2021-02-09 DIAGNOSIS — M16.12 UNILATERAL PRIMARY OSTEOARTHRITIS, LEFT HIP: ICD-10-CM

## 2021-02-09 DIAGNOSIS — M25.552 PAIN IN LEFT HIP: ICD-10-CM

## 2021-02-09 PROCEDURE — 99024 POSTOP FOLLOW-UP VISIT: CPT

## 2021-02-09 PROCEDURE — 73502 X-RAY EXAM HIP UNI 2-3 VIEWS: CPT | Mod: LT

## 2021-02-09 NOTE — DISCUSSION/SUMMARY
[de-identified] : The patient is doing well after left total hip arthroplasty. Written infectious precautions were reviewed. The patient will progress with physical therapy at this time and they will work on transitioning from requiring assistive devices for ambulation. Aspirin therapy will be continued for the full 4 week postoperative course for the purpose of orthopedic thromboembolism prophylaxis. Return around the 6 week anniversary from surgery for follow-up evaluation.

## 2021-02-09 NOTE — HISTORY OF PRESENT ILLNESS
[de-identified] : Status-post left total hip arthroplasty here for initial postoperative evaluation. Excellent progress is noted in terms of pain and restoration of function. Pain is well controlled with oral medications. There has been no change in medical health since discharge. The patient does require assistive devices.

## 2021-02-09 NOTE — PHYSICAL EXAM
[de-identified] : Patient is well nourished, well-developed, in no acute distress, with appropriate mood and affect. The patient is oriented to time, place, and person. Respirations are even and unlabored. Examination reveals satisfactory wound healing. No surrounding erythema. Motion is good and relatively pain free. Leg lengths are acceptable.\par \par  [de-identified] : AP pelvis, AP hip, and lateral x-rays of the left hip were ordered and obtained in the office and demonstrate satisfactory position and alignment of the components are present. No signs of loosening are seen.  Mild subsidence is noted although this is in a functional position for this.

## 2021-03-09 ENCOUNTER — APPOINTMENT (OUTPATIENT)
Dept: ORTHOPEDIC SURGERY | Facility: CLINIC | Age: 61
End: 2021-03-09
Payer: COMMERCIAL

## 2021-03-09 PROCEDURE — 99024 POSTOP FOLLOW-UP VISIT: CPT

## 2021-03-09 NOTE — PHYSICAL EXAM
[de-identified] : Well developed, well nourished in no apparent distress, awake, alert and orientated to person, place and time with appropriate mood and affect\par Respirations are even and unlabored. Gait evaluation does not reveal a limp. There is no inguinal adenopathy. The affected limb is well-perfused with palpable pedal pulse, without skin lesions, shows a grossly normal motor and sensory examination. Incision is healed Hip motion is full and painless throughout ROM. Leg lengths are approximately equal

## 2021-03-09 NOTE — DISCUSSION/SUMMARY
[de-identified] : The patient is doing well after left total hip replacement surgery. Continue to be weight bearing as tolerated without restriction. Follow up is recommended at the 6 month anniversary from surgery.

## 2021-03-09 NOTE — HISTORY OF PRESENT ILLNESS
[de-identified] : This is very nice 60 year male  here for interim evaluation of left total hip arthroplasty. The patient reports good pain relief since surgery in the replaced joint and satisfactory restoration of function in terms of activities of daily living. The patient  requires an assistive device for ambulation, does not require pain medication, and completed postoperative physical therapy. They report unlimited activities of daily living and unlimited walking tolerance. The patient is thrilled with their progress from surgery and ultimate outcome.

## 2021-04-25 ENCOUNTER — FORM ENCOUNTER (OUTPATIENT)
Age: 61
End: 2021-04-25

## 2021-07-15 NOTE — PHYSICAL THERAPY INITIAL EVALUATION ADULT - NAME OF CLINICIAN
MARINA Zaidi Information: Selecting Yes will display possible errors in your note based on the variables you have selected. This validation is only offered as a suggestion for you. PLEASE NOTE THAT THE VALIDATION TEXT WILL BE REMOVED WHEN YOU FINALIZE YOUR NOTE. IF YOU WANT TO FAX A PRELIMINARY NOTE YOU WILL NEED TO TOGGLE THIS TO 'NO' IF YOU DO NOT WANT IT IN YOUR FAXED NOTE.

## 2021-07-21 ENCOUNTER — APPOINTMENT (OUTPATIENT)
Dept: ORTHOPEDIC SURGERY | Facility: CLINIC | Age: 61
End: 2021-07-21
Payer: COMMERCIAL

## 2021-07-21 PROCEDURE — 99072 ADDL SUPL MATRL&STAF TM PHE: CPT

## 2021-07-21 PROCEDURE — 73502 X-RAY EXAM HIP UNI 2-3 VIEWS: CPT | Mod: LT

## 2021-07-21 PROCEDURE — 99213 OFFICE O/P EST LOW 20 MIN: CPT

## 2021-07-21 NOTE — PHYSICAL EXAM
[de-identified] : Well developed, well nourished in no apparent distress, awake, alert and orientated to person, place and time with appropriate mood and affect\par Respirations are even and unlabored. Gait evaluation does not reveal a limp. There is no inguinal adenopathy. The affected limb is well-perfused with palpable pedal pulse, without skin lesions, shows a grossly normal motor and sensory examination. Incision is healed Hip motion is limited with IR to neutral and painless throughout ROM. Leg lengths are approximately equal  [de-identified] : AP pelvis, AP hip, and lateral x-rays of the left hip were ordered and obtained in the office and demonstrate satisfactory position and alignment of the components are present. No signs of loosening are seen.

## 2021-07-21 NOTE — HISTORY OF PRESENT ILLNESS
[de-identified] : This is very nice 60 year male  here for interim evaluation of left total hip arthroplasty (1/2021). The patient reports good pain relief since surgery in the replaced joint and satisfactory restoration of function in terms of activities of daily living. The patient  requires an assistive device for ambulation, does not require pain medication, and completed postoperative physical therapy. They report unlimited activities of daily living and unlimited walking tolerance. The patient is thrilled with their progress from surgery and ultimate outcome.

## 2021-07-21 NOTE — DISCUSSION/SUMMARY
[de-identified] : The patient is doing well after left total hip replacement surgery. Continue to be weight bearing as tolerated without restriction. Follow up is recommended at the 1 year anniversary from surgery.

## 2021-11-16 NOTE — H&P PST ADULT - BACK
PAST SURGICAL HISTORY:  No significant past surgical history      No deformity or limitation of movement

## 2022-01-18 ENCOUNTER — APPOINTMENT (OUTPATIENT)
Dept: ORTHOPEDIC SURGERY | Facility: CLINIC | Age: 62
End: 2022-01-18
Payer: COMMERCIAL

## 2022-01-18 VITALS — BODY MASS INDEX: 37.51 KG/M2 | WEIGHT: 283 LBS | HEIGHT: 73 IN

## 2022-01-18 DIAGNOSIS — Z96.642 PRESENCE OF LEFT ARTIFICIAL HIP JOINT: ICD-10-CM

## 2022-01-18 PROCEDURE — 73502 X-RAY EXAM HIP UNI 2-3 VIEWS: CPT

## 2022-01-18 PROCEDURE — 99213 OFFICE O/P EST LOW 20 MIN: CPT

## 2022-01-18 RX ORDER — ALLOPURINOL 300 MG/1
300 TABLET ORAL
Qty: 30 | Refills: 0 | Status: ACTIVE | COMMUNITY
Start: 2021-12-06

## 2022-01-18 NOTE — DISCUSSION/SUMMARY
[de-identified] : The patient is doing well after left total hip arthroplasty. Continue to be weight bearing as tolerated without restriction. Follow up is recommended every 3 to 5 years for long-term monitoring

## 2022-01-18 NOTE — HISTORY OF PRESENT ILLNESS
[de-identified] : This is very nice 61-year-old gentleman here for interim evaluation of left total hip arthroplasty. The patient reports good pain relief since surgery in the replaced joint and satisfactory restoration of function in terms of activities of daily living. The patient no longer requires an assistive device for ambulation, does not require pain medication, and completed postoperative physical therapy. They report unlimited activities of daily living and unlimited walking tolerance. The patient is thrilled with their progress from surgery and ultimate outcome. \par

## 2022-01-18 NOTE — PHYSICAL EXAM
[de-identified] : Patient is well nourished, well-developed, in no acute distress, with appropriate mood and affect. The patient is oriented to time, place, and person. Respirations are even and unlabored. Gait evaluation does not reveal a limp. There is no inguinal adenopathy. Examination of the contralateral hip shows normal range of motion, strength, no tenderness, and intact skin. The affected limb is well-perfused and showed 2+ dp/pt pulses, without skin lesions, shows a grossly normal motor and sensory examination. Examination of the hip shows a well healed surgical scar. Hip motion is full and painless from 0-90 degrees extension to flexion, 20 degrees adduction and 20 degrees abduction, and 15 degrees internal and 30 degrees external rotation. Leg lengths are approximately equal. Both hips are stable and muscle strength is normal with good strength with resisted abduction and adduction. Pedal pulses are palpable. [de-identified] : AP pelvis, AP hip, and lateral x-rays of the left hip were ordered and obtained in the office and demonstrate satisfactory position and alignment of the components are present. No signs of loosening are seen.

## 2023-05-09 NOTE — ED ADULT TRIAGE NOTE - TEMPERATURE IN CELSIUS (DEGREES C)
36.6 Olumiant Counseling: I discussed with the patient the risks of Olumiant therapy including but not limited to upper respiratory tract infections, shingles, cold sores, and nausea. Live vaccines should be avoided.  This medication has been linked to serious infections; higher rate of mortality; malignancy and lymphoproliferative disorders; major adverse cardiovascular events; thrombosis; gastrointestinal perforations; neutropenia; lymphopenia; anemia; liver enzyme elevations; and lipid elevations.

## 2024-09-27 NOTE — OCCUPATIONAL THERAPY INITIAL EVALUATION ADULT - PLANNED THERAPY INTERVENTIONS, OT EVAL
[de-identified] : 2/20/24: MRI brain: increased parenchymal volume loss and chronic microvessel ischemic changes. Small high right frontal meningioma.  [de-identified] : 3/18/24 :mild cognitive impairment of uncertain of unknown etiology. Lack of progressive  cognitive decline argue against neurodegenerative etiology.  [de-identified] :  9/16/24 vitamin d 43.4, TSH 5.24, Cr 1.01, GFR 56, a1c 5.3.   ADL retraining/balance training/bed mobility training/strengthening/transfer training